# Patient Record
Sex: MALE | Race: WHITE | Employment: UNEMPLOYED | URBAN - METROPOLITAN AREA
[De-identification: names, ages, dates, MRNs, and addresses within clinical notes are randomized per-mention and may not be internally consistent; named-entity substitution may affect disease eponyms.]

---

## 2024-06-12 ENCOUNTER — HOSPITAL ENCOUNTER (EMERGENCY)
Facility: HOSPITAL | Age: 19
Discharge: HOME | End: 2024-06-12
Attending: EMERGENCY MEDICINE

## 2024-06-12 ENCOUNTER — APPOINTMENT (OUTPATIENT)
Facility: HOSPITAL | Age: 19
End: 2024-06-12

## 2024-06-12 VITALS
HEART RATE: 71 BPM | WEIGHT: 185 LBS | HEIGHT: 70 IN | SYSTOLIC BLOOD PRESSURE: 117 MMHG | RESPIRATION RATE: 15 BRPM | DIASTOLIC BLOOD PRESSURE: 53 MMHG | OXYGEN SATURATION: 97 % | TEMPERATURE: 98 F | BODY MASS INDEX: 26.48 KG/M2

## 2024-06-12 DIAGNOSIS — S09.90XA CLOSED HEAD INJURY, INITIAL ENCOUNTER: Primary | ICD-10-CM

## 2024-06-12 DIAGNOSIS — S00.03XA CONTUSION OF SCALP, INITIAL ENCOUNTER: ICD-10-CM

## 2024-06-12 PROCEDURE — 94761 N-INVAS EAR/PLS OXIMETRY MLT: CPT

## 2024-06-12 PROCEDURE — 99285 EMERGENCY DEPT VISIT HI MDM: CPT

## 2024-06-12 PROCEDURE — 70450 CT HEAD/BRAIN W/O DYE: CPT

## 2024-06-12 ASSESSMENT — LIFESTYLE VARIABLES
HOW MANY STANDARD DRINKS CONTAINING ALCOHOL DO YOU HAVE ON A TYPICAL DAY: PATIENT DOES NOT DRINK
HOW OFTEN DO YOU HAVE A DRINK CONTAINING ALCOHOL: NEVER

## 2024-06-12 ASSESSMENT — PAIN SCALES - GENERAL
PAINLEVEL_OUTOF10: 0
PAINLEVEL_OUTOF10: 0

## 2024-06-12 ASSESSMENT — PAIN - FUNCTIONAL ASSESSMENT: PAIN_FUNCTIONAL_ASSESSMENT: 0-10

## 2024-06-12 NOTE — ED TRIAGE NOTES
Pt from [facility] with complaints of hitting head on wall with intent of self harm. Pt was given 1mg of ativan around 5:15. Pt has autism and is detoxing from opioids, last used yesterday. Was a self admit at [facility] for SI

## 2024-06-12 NOTE — DISCHARGE INSTRUCTIONS
Thank you for choosing our Emergency Department for your care.  It is our privilege to care for you in your time of need.  In the next several days, you may receive a survey via email or mailed to your home about your experience with our team.  We would greatly appreciate you taking a few minutes to complete the survey, as we use this information to learn what we have done well and what we could be doing better. Thank you for trusting us with your care!    Below you will find a list of your tests from today's visit.   Labs  No results found for this or any previous visit (from the past 12 hour(s)).    Radiologic Studies  CT Head W/O Contrast   Final Result   No acute intracranial abnormality            Electronically signed by Uli Padilla        ------------------------------------------------------------------------------------------------------------  The evaluation and treatment you received in the Emergency Department were for an urgent problem. It is important that you follow-up with a doctor, nurse practitioner, or physician assistant to:  (1) confirm your diagnosis,  (2) re-evaluation of changes in your illness and treatment, and (3) for ongoing care. Please take your discharge instructions with you when you go to your follow-up appointment.     If you have any problem arranging a follow-up appointment, contact us!  If your symptoms become worse or you do not improve as expected, please return to us. We are available 24 hours a day.     If a prescription has been provided, please fill it as soon as possible to prevent a delay in treatment. If you have any questions or reservations about taking the medication due to side effects or interactions with other medications, please call your primary care provider or contact us directly.  Again, THANK YOU for choosing us to care for YOU!

## 2024-06-12 NOTE — ED PROVIDER NOTES
HENT:      Head: Normocephalic.      Comments: Abrasion and contusion to forehead     Mouth/Throat:      Mouth: Mucous membranes are moist.      Pharynx: No posterior oropharyngeal erythema.   Eyes:      Extraocular Movements: Extraocular movements intact.      Conjunctiva/sclera: Conjunctivae normal.      Pupils: Pupils are equal, round, and reactive to light.   Cardiovascular:      Rate and Rhythm: Normal rate and regular rhythm.   Pulmonary:      Effort: Pulmonary effort is normal. No respiratory distress.      Breath sounds: Normal breath sounds. No stridor. No wheezing, rhonchi or rales.   Abdominal:      General: Abdomen is flat. There is no distension.      Tenderness: There is no abdominal tenderness.   Musculoskeletal:         General: Normal range of motion.   Skin:     General: Skin is warm and dry.      Capillary Refill: Capillary refill takes less than 2 seconds.   Neurological:      General: No focal deficit present.      Mental Status: He is alert. Mental status is at baseline.            SCREENINGS                  LAB, EKG AND DIAGNOSTIC RESULTS   Labs:  No results found for this or any previous visit (from the past 12 hour(s)).    EKG: Not Applicable    Radiologic Studies:  Non-plain film images such as CT, Ultrasound and MRI are read by the radiologist. Plain radiographic images are visualized and preliminarily interpreted by the ED Physician with the following findings: Not Applicable.    Interpretation per the Radiologist below, if available at the time of this note:  CT Head W/O Contrast   Final Result   No acute intracranial abnormality            Electronically signed by Uli Padilla           ED COURSE and DIFFERENTIAL DIAGNOSIS/MDM   12:27 AM Differential and Considerations: 19-year-old male presenting from psychiatric hospital after he struck his head multiple times intentionally against a wall, then fell and hit his head again.  Patient at his baseline mental status, answering

## 2024-09-30 ENCOUNTER — TELEPHONE (OUTPATIENT)
Age: 19
End: 2024-09-30

## 2024-09-30 NOTE — TELEPHONE ENCOUNTER
HIPAA Verified (if caller is someone other than patient): yes       Reason for Call:     Reason for appointment:   Reschedule NP appt    Reason appointment not scheduled at time of call:   N/A    Requested Provider:   Other      Additional Notes (as needed):    and If calling to cancel, does patient want to reschedule?   N/A    Is this call related to results?   no    If yes, please let patient know they must wait for their follow up / feedback appointment to discuss.  They can, however,  a copy of the results at the clinic 2-3 weeks after their testing appt.     Is this a New Patient Appointment Request?   yes    If yes:   Requested Provider (choose all that apply - patient doesn't need to call ALL locations):   Ant    Referred By:  N/A    Referral in chart?   N/A    Patient's Insurance Carrier (please ensure you gather insurance information):   [employer]    Additional notes / reason for call:   Pt's mother was calling to confirm pt's appt. I informed that NP appt was cancelled back in August due to provider being out of office. States there was no way of them knowing due to the contact being the pt. Is requesting to contact her directly to assist with the scheduling as patient is not able to       **Please advise callers that it could take up to 5 business days for a return call**        Message: (any additional details from patient/caller not covered above)          Level 1 Calls - attempted to reach practice? N/A     Reason Call Marked High Priority (if applicable):

## 2024-10-02 ENCOUNTER — TELEPHONE (OUTPATIENT)
Age: 19
End: 2024-10-02

## 2024-10-29 ENCOUNTER — OFFICE VISIT (OUTPATIENT)
Age: 19
End: 2024-10-29
Payer: COMMERCIAL

## 2024-10-29 DIAGNOSIS — G31.84 MILD COGNITIVE IMPAIRMENT: Primary | ICD-10-CM

## 2024-10-29 DIAGNOSIS — Z87.820 HISTORY OF MULTIPLE CONCUSSIONS: ICD-10-CM

## 2024-10-29 DIAGNOSIS — R41.840 EASILY DISTRACTABLE ON EXAMINATION: ICD-10-CM

## 2024-10-29 DIAGNOSIS — F19.11 HISTORY OF SUBSTANCE ABUSE (HCC): ICD-10-CM

## 2024-10-29 DIAGNOSIS — F84.0 AUTISM SPECTRUM: ICD-10-CM

## 2024-10-29 DIAGNOSIS — F12.10 MARIJUANA ABUSE, CONTINUOUS: ICD-10-CM

## 2024-10-29 PROCEDURE — 90791 PSYCH DIAGNOSTIC EVALUATION: CPT | Performed by: CLINICAL NEUROPSYCHOLOGIST

## 2024-10-29 NOTE — PROGRESS NOTES
[facility] NEUROSCIENCE [facility] MEDICAL/EMERGENCY CENTER  NEUROLOGY CLINIC   [address] [place] [address]   [phone] Office   [phone] Fax      Neuropsychology    Initial Diagnostic Interview Note      Referral:  PCP    Alirio Estrada is a 19 y.o. right handed single  male who was unaccompanied to the initial clinical interview on 10/29/2024.  Please refer to his medical records for details pertaining to his history.   At the start of the appointment, I reviewed the patient's [facility] Epic Chart (including Media scanned in from previous providers) for the active Problem List, all pertinent Past Medical Hx, medications, recent radiologic and laboratory findings.  In addition, I reviewed pt's documented Immunization Record and Encounter History.     Chief Complaint: New patient, establish care, for neurocognitive and psychologic concerns, as outlined above.     High school completed and was diagnosed with Autism in school and is currently on mirtazipine.  Venlafaxine.  He is on suboxone.  The patient is here for evaluation status post concussive injuries sustained during deliberate self harm. Here now for evaluaiton. He is confused today and struggles with short term memory. Forgets the content of conversations. Misplaces things. No matter how much sleep he gets he is not well rested.   He uses marijuana heavily daily.      Alirio Estrada is a 19 y.o. male brought in from [facility] for evaluation of head injury.  Patient has history of meth and narcotic use.  He was apparently hitting his head against a wall because he was \"frustrated\".  He then fell and hit his head again.         Alirio Estrada is a 17 y.o. male who presented to the ED with suicidal ideation with a plan for the past 2 days. He reports that he has been feeling down for the past 2 weeks, in the setting of quitting marijuana. He quit because his parents have started drug testing

## 2024-11-18 ENCOUNTER — PROCEDURE VISIT (OUTPATIENT)
Age: 19
End: 2024-11-18
Payer: COMMERCIAL

## 2024-11-18 DIAGNOSIS — F43.10 PTSD (POST-TRAUMATIC STRESS DISORDER): ICD-10-CM

## 2024-11-18 DIAGNOSIS — F60.89 MIXED PERSONALITY DISORDER (HCC): ICD-10-CM

## 2024-11-18 DIAGNOSIS — F82 FINE MOTOR DEVELOPMENT DELAY: ICD-10-CM

## 2024-11-18 DIAGNOSIS — F90.0 ATTENTION DEFICIT HYPERACTIVITY DISORDER (ADHD), INATTENTIVE TYPE, SEVERE: Chronic | ICD-10-CM

## 2024-11-18 DIAGNOSIS — F32.A ANXIETY AND DEPRESSION: ICD-10-CM

## 2024-11-18 DIAGNOSIS — F84.0 AUTISM SPECTRUM: ICD-10-CM

## 2024-11-18 DIAGNOSIS — F19.11 HISTORY OF SUBSTANCE ABUSE (HCC): ICD-10-CM

## 2024-11-18 DIAGNOSIS — F41.9 ANXIETY AND DEPRESSION: ICD-10-CM

## 2024-11-18 DIAGNOSIS — Z87.820 HISTORY OF MULTIPLE CONCUSSIONS: ICD-10-CM

## 2024-11-18 DIAGNOSIS — G31.84 MILD COGNITIVE IMPAIRMENT: Primary | ICD-10-CM

## 2024-11-18 DIAGNOSIS — R45.851 PASSIVE SUICIDAL IDEATIONS: ICD-10-CM

## 2024-11-18 PROCEDURE — 96139 PSYCL/NRPSYC TST TECH EA: CPT | Performed by: CLINICAL NEUROPSYCHOLOGIST

## 2024-11-18 PROCEDURE — 96133 NRPSYC TST EVAL PHYS/QHP EA: CPT | Performed by: CLINICAL NEUROPSYCHOLOGIST

## 2024-11-18 PROCEDURE — 96138 PSYCL/NRPSYC TECH 1ST: CPT | Performed by: CLINICAL NEUROPSYCHOLOGIST

## 2024-11-18 PROCEDURE — 96132 NRPSYC TST EVAL PHYS/QHP 1ST: CPT | Performed by: CLINICAL NEUROPSYCHOLOGIST

## 2024-11-19 NOTE — PROGRESS NOTES
[facility] NEUROSCIENCE [facility] MEDICAL/EMERGENCY CENTER  NEUROLOGY CLINIC   [address]   [place] [address]   [phone] Office   [phone] Fax      Neuropsychological Evaluation Report    Referral:  PCP    Alirio Estrada is a 19 y.o. right handed single  male who was unaccompanied to the initial clinical interview on 10/29/2024.  Please refer to his medical records for details pertaining to his history.   At the start of the appointment, I reviewed the patient's [facility] Epic Chart (including Media scanned in from previous providers) for the active Problem List, all pertinent Past Medical Hx, medications, recent radiologic and laboratory findings.  In addition, I reviewed pt's documented Immunization Record and Encounter History.     Chief Complaint: Cognitive Difficulty    High school completed and was diagnosed with Autism in school and is currently on mirtazipine.  Venlafaxine.  He is on suboxone.  The patient is here for evaluation status post concussive injuries sustained during deliberate self harm. Here now for evaluaiton. He is confused today and struggles with short term memory. Forgets the content of conversations. Misplaces things. No matter how much sleep he gets he is not well rested.   He uses marijuana heavily daily.      Alirio Estrada is a 19 y.o. male brought in from [facility] for evaluation of head injury.  Patient has history of meth and narcotic use.  He was apparently hitting his head against a wall because he was \"frustrated\".  He then fell and hit his head again.         Alirio Estrada is a 17 y.o. male who presented to the ED with suicidal ideation with a plan for the past 2 days. He reports that he has been feeling down for the past 2 weeks, in the setting of quitting marijuana. He quit because his parents have started drug testing him. He has a plan to drive his car as fast as he can off a [place] or bridge. He has been

## 2024-12-11 ENCOUNTER — OFFICE VISIT (OUTPATIENT)
Age: 19
End: 2024-12-11
Payer: COMMERCIAL

## 2024-12-11 DIAGNOSIS — F43.10 PTSD (POST-TRAUMATIC STRESS DISORDER): ICD-10-CM

## 2024-12-11 DIAGNOSIS — F19.11 HISTORY OF SUBSTANCE ABUSE (HCC): ICD-10-CM

## 2024-12-11 DIAGNOSIS — F41.9 ANXIETY AND DEPRESSION: ICD-10-CM

## 2024-12-11 DIAGNOSIS — R45.851 PASSIVE SUICIDAL IDEATIONS: ICD-10-CM

## 2024-12-11 DIAGNOSIS — F60.89 MIXED PERSONALITY DISORDER (HCC): ICD-10-CM

## 2024-12-11 DIAGNOSIS — F32.A ANXIETY AND DEPRESSION: ICD-10-CM

## 2024-12-11 DIAGNOSIS — Z87.820 HISTORY OF MULTIPLE CONCUSSIONS: ICD-10-CM

## 2024-12-11 DIAGNOSIS — G31.84 MILD COGNITIVE IMPAIRMENT: Primary | ICD-10-CM

## 2024-12-11 DIAGNOSIS — F84.0 AUTISM SPECTRUM: ICD-10-CM

## 2024-12-11 PROCEDURE — 96132 NRPSYC TST EVAL PHYS/QHP 1ST: CPT | Performed by: CLINICAL NEUROPSYCHOLOGIST

## 2024-12-11 NOTE — PROGRESS NOTES
Chief Complaint:  Follow up to discuss test results with this established patient related to neurocognitive and psychologic functioning, cognitive concerns and emotional/mood concerns as outlined below.     A neuropsychological evaluation was completed with the patient on 11/18/2024     Prior to seeing the patient for today's visit, I reviewed pertinent records, including the previously completed report, the records in Epic, and any updated visits from other providers since the patient's last visit.     During today's appointment I administered the following measures: NMSE, Fluency.  Exam normal          I provided feedback services related to the previously completed report. Attendees included: Patient and mother. Education was provided regarding my diagnostic impressions, and we discussed treatment plan/options. Attendees were provided with the opportunity to ask questions, which were answered to the best of my ability.     We discussed, in detail, the following:    This patient generated an abnormal range range Neuropsychological Evaluation with respect to neurocognitive functioning.  In this regard, severe impairment is noted for sustained visual attention (inattentiveness without impulsivity issues are noted) as well as fine motor coordination.  His executive functioning is markedly impaired.  Mild to moderate impairments are noted for verbal fluency and motor skills are mixed.  There are marked problems with auditory learning and memory.  Visual organization and visual memory scores are normal.  From an emotional standpoint, there is severe major depression, severe anxiety, somatization, PTSD, and mixed borderline and antisocial personality disorders.  There is substance abuse which significantly enhance his underlying day-to-day neurocognitive and psychiatric/psychologic difficulties.  This is a complex combination of organic and functional and psychiatric as well as developmental problems.  He reports

## 2024-12-17 ENCOUNTER — TELEPHONE (OUTPATIENT)
Age: 19
End: 2024-12-17

## 2024-12-17 NOTE — TELEPHONE ENCOUNTER
Patient mother requesting to send medical records to here son's PCP (Dr. Shaw)     Fax number - [phone]    Dr. Shaw needs this in order to prescribe medication.

## 2024-12-23 NOTE — TELEPHONE ENCOUNTER
Patient mother calling back requesting to fax the office visit notes from 12/11/24    Dr. Shaw  Fax number - [phone]

## 2024-12-30 NOTE — TELEPHONE ENCOUNTER
Mother calling back concerning the pt's report stated there are things in the report that they did not discuss.

## 2025-01-08 NOTE — TELEPHONE ENCOUNTER
Is requesting a call back to discuss something on report that was not discussed at visit.    Please contact.

## 2025-01-16 NOTE — TELEPHONE ENCOUNTER
Patient mother requesting to speak with Dr. Cain about her son current medical condition    She's upset because she found out through his PCP from Dr. Cain report that her son has been deemed disabled.

## 2025-01-23 ENCOUNTER — TELEPHONE (OUTPATIENT)
Age: 20
End: 2025-01-23

## 2025-01-23 NOTE — TELEPHONE ENCOUNTER
Received a call from [facility] for [facility].  He has a message that he needs to get to Dr Cain.  He requests a call back. His extension [phone]

## 2025-01-27 NOTE — TELEPHONE ENCOUNTER
Call rep back and entered 10 digit ext and it doesn't work, was on hold for about 10 mins to speak to someone and still no answer

## 2025-02-07 ENCOUNTER — TELEPHONE (OUTPATIENT)
Age: 20
End: 2025-02-07

## 2025-02-12 ENCOUNTER — TELEPHONE (OUTPATIENT)
Age: 20
End: 2025-02-12